# Patient Record
Sex: FEMALE | Race: WHITE | NOT HISPANIC OR LATINO | URBAN - METROPOLITAN AREA
[De-identification: names, ages, dates, MRNs, and addresses within clinical notes are randomized per-mention and may not be internally consistent; named-entity substitution may affect disease eponyms.]

---

## 2019-11-04 ENCOUNTER — APPOINTMENT (RX ONLY)
Dept: URBAN - METROPOLITAN AREA CLINIC 57 | Facility: CLINIC | Age: 74
Setting detail: DERMATOLOGY
End: 2019-11-04

## 2019-11-04 DIAGNOSIS — R52 PAIN, UNSPECIFIED: ICD-10-CM

## 2019-11-04 DIAGNOSIS — L81.4 OTHER MELANIN HYPERPIGMENTATION: ICD-10-CM

## 2019-11-04 PROBLEM — M12.9 ARTHROPATHY, UNSPECIFIED: Status: ACTIVE | Noted: 2019-11-04

## 2019-11-04 PROBLEM — I10 ESSENTIAL (PRIMARY) HYPERTENSION: Status: ACTIVE | Noted: 2019-11-04

## 2019-11-04 PROCEDURE — ? TREATMENT REGIMEN

## 2019-11-04 PROCEDURE — ? RECOMMENDATIONS

## 2019-11-04 PROCEDURE — 99203 OFFICE O/P NEW LOW 30 MIN: CPT

## 2019-11-04 ASSESSMENT — LOCATION DETAILED DESCRIPTION DERM
LOCATION DETAILED: LEFT PROXIMAL POSTERIOR UPPER ARM
LOCATION DETAILED: LEFT MEDIAL UPPER BACK
LOCATION DETAILED: INFERIOR THORACIC SPINE
LOCATION DETAILED: RIGHT PROXIMAL POSTERIOR UPPER ARM

## 2019-11-04 ASSESSMENT — LOCATION SIMPLE DESCRIPTION DERM
LOCATION SIMPLE: LEFT UPPER BACK
LOCATION SIMPLE: RIGHT POSTERIOR UPPER ARM
LOCATION SIMPLE: LEFT POSTERIOR UPPER ARM
LOCATION SIMPLE: UPPER BACK

## 2019-11-04 ASSESSMENT — LOCATION ZONE DERM
LOCATION ZONE: TRUNK
LOCATION ZONE: ARM

## 2019-11-04 NOTE — HPI: OTHER
Condition:: Pain
Please Describe Your Condition:: comes in for a chief complaint of pain in her chest and sides. She states it started in April, but she has had this same condition a few other times, the first episode being 12 years ago. \\n\\nShe states her skin feels raw, but there is nothing to see. She states sometimes it is a sharp, jabbing pain.\\n\\nShe states the pain jumps around. It has been in her breasts, nipples, chest and axillary areas.\\n\\nTwelve years ago she saw a doctor in [place] who told her it was shingles that is broken out on the inside. He suggested she get the shingle vaccine, which she did. She was also prescribed Gabapentin, but that didn’t \\nhelp and it made her dizzy.\\n\\nShe states usually it will go away on its own, but it hasn’t this time.\\n\\nShe states Dr. Zhao referred her here as he doesn’t think it is shingles.

## 2019-11-04 NOTE — PROCEDURE: RECOMMENDATIONS
Recommendation Preamble: The following recommendations were made during the visit:
Detail Level: Zone
Recommendations (Free Text): Discussed that this not skin related and we do not feel it is shingles. We recommended she try Aleve for a few weeks and if that doesn’t help then she needs to contact Dr. Zhao as there is nothing we can do. Dr. Zhao could think about prescribing Neurontin.